# Patient Record
Sex: MALE | Race: BLACK OR AFRICAN AMERICAN | NOT HISPANIC OR LATINO | URBAN - METROPOLITAN AREA
[De-identification: names, ages, dates, MRNs, and addresses within clinical notes are randomized per-mention and may not be internally consistent; named-entity substitution may affect disease eponyms.]

---

## 2019-07-17 ENCOUNTER — INPATIENT (INPATIENT)
Facility: HOSPITAL | Age: 19
LOS: 1 days | Discharge: HOME | End: 2019-07-19
Attending: INTERNAL MEDICINE | Admitting: INTERNAL MEDICINE
Payer: SELF-PAY

## 2019-07-17 VITALS
SYSTOLIC BLOOD PRESSURE: 156 MMHG | TEMPERATURE: 101 F | HEIGHT: 67 IN | WEIGHT: 160.06 LBS | OXYGEN SATURATION: 98 % | HEART RATE: 124 BPM | DIASTOLIC BLOOD PRESSURE: 83 MMHG | RESPIRATION RATE: 22 BRPM

## 2019-07-17 DIAGNOSIS — R19.7 DIARRHEA, UNSPECIFIED: ICD-10-CM

## 2019-07-17 DIAGNOSIS — E83.39 OTHER DISORDERS OF PHOSPHORUS METABOLISM: ICD-10-CM

## 2019-07-17 DIAGNOSIS — A41.9 SEPSIS, UNSPECIFIED ORGANISM: ICD-10-CM

## 2019-07-17 DIAGNOSIS — A08.4 VIRAL INTESTINAL INFECTION, UNSPECIFIED: ICD-10-CM

## 2019-07-17 DIAGNOSIS — R11.2 NAUSEA WITH VOMITING, UNSPECIFIED: ICD-10-CM

## 2019-07-17 DIAGNOSIS — E83.42 HYPOMAGNESEMIA: ICD-10-CM

## 2019-07-17 DIAGNOSIS — E87.2 ACIDOSIS: ICD-10-CM

## 2019-07-17 DIAGNOSIS — R65.20 SEVERE SEPSIS WITHOUT SEPTIC SHOCK: ICD-10-CM

## 2019-07-17 DIAGNOSIS — E86.0 DEHYDRATION: ICD-10-CM

## 2019-07-17 LAB
ALBUMIN SERPL ELPH-MCNC: 4.1 G/DL — SIGNIFICANT CHANGE UP (ref 3.3–5)
ALP SERPL-CCNC: 77 U/L — SIGNIFICANT CHANGE UP (ref 40–120)
ALT FLD-CCNC: 25 U/L — SIGNIFICANT CHANGE UP (ref 12–78)
ANION GAP SERPL CALC-SCNC: 11 MMOL/L — SIGNIFICANT CHANGE UP (ref 5–17)
APAP SERPL-MCNC: < 2 UG/ML (ref 10–30)
APTT BLD: 34.2 SEC — SIGNIFICANT CHANGE UP (ref 27.5–36.3)
AST SERPL-CCNC: 14 U/L — LOW (ref 15–37)
BASOPHILS # BLD AUTO: 0.04 K/UL — SIGNIFICANT CHANGE UP (ref 0–0.2)
BASOPHILS NFR BLD AUTO: 0.5 % — SIGNIFICANT CHANGE UP (ref 0–2)
BILIRUB SERPL-MCNC: 0.6 MG/DL — SIGNIFICANT CHANGE UP (ref 0.2–1.2)
BUN SERPL-MCNC: 13 MG/DL — SIGNIFICANT CHANGE UP (ref 7–23)
CALCIUM SERPL-MCNC: 9.3 MG/DL — SIGNIFICANT CHANGE UP (ref 8.5–10.1)
CHLORIDE SERPL-SCNC: 103 MMOL/L — SIGNIFICANT CHANGE UP (ref 96–108)
CO2 SERPL-SCNC: 24 MMOL/L — SIGNIFICANT CHANGE UP (ref 22–31)
CREAT SERPL-MCNC: 1.3 MG/DL — SIGNIFICANT CHANGE UP (ref 0.5–1.3)
EOSINOPHIL # BLD AUTO: 0.06 K/UL — SIGNIFICANT CHANGE UP (ref 0–0.5)
EOSINOPHIL NFR BLD AUTO: 0.7 % — SIGNIFICANT CHANGE UP (ref 0–6)
ETHANOL SERPL-MCNC: <10 MG/DL — SIGNIFICANT CHANGE UP (ref 0–10)
FLU A RESULT: SIGNIFICANT CHANGE UP
FLU A RESULT: SIGNIFICANT CHANGE UP
FLUAV AG NPH QL: SIGNIFICANT CHANGE UP
FLUBV AG NPH QL: SIGNIFICANT CHANGE UP
GLUCOSE SERPL-MCNC: 102 MG/DL — HIGH (ref 70–99)
HCT VFR BLD CALC: 47 % — SIGNIFICANT CHANGE UP (ref 39–50)
HGB BLD-MCNC: 14.9 G/DL — SIGNIFICANT CHANGE UP (ref 13–17)
IMM GRANULOCYTES NFR BLD AUTO: 0.4 % — SIGNIFICANT CHANGE UP (ref 0–1.5)
INR BLD: 1.03 RATIO — SIGNIFICANT CHANGE UP (ref 0.88–1.16)
LACTATE SERPL-SCNC: 1.6 MMOL/L — SIGNIFICANT CHANGE UP (ref 0.7–2)
LACTATE SERPL-SCNC: 3.2 MMOL/L — HIGH (ref 0.7–2)
LYMPHOCYTES # BLD AUTO: 0.79 K/UL — LOW (ref 1–3.3)
LYMPHOCYTES # BLD AUTO: 9.4 % — LOW (ref 13–44)
MCHC RBC-ENTMCNC: 26.6 PG — LOW (ref 27–34)
MCHC RBC-ENTMCNC: 31.7 GM/DL — LOW (ref 32–36)
MCV RBC AUTO: 83.9 FL — SIGNIFICANT CHANGE UP (ref 80–100)
MONOCYTES # BLD AUTO: 0.38 K/UL — SIGNIFICANT CHANGE UP (ref 0–0.9)
MONOCYTES NFR BLD AUTO: 4.5 % — SIGNIFICANT CHANGE UP (ref 2–14)
NEUTROPHILS # BLD AUTO: 7.08 K/UL — SIGNIFICANT CHANGE UP (ref 1.8–7.4)
NEUTROPHILS NFR BLD AUTO: 84.5 % — HIGH (ref 43–77)
NRBC # BLD: 0 /100 WBCS — SIGNIFICANT CHANGE UP (ref 0–0)
PLATELET # BLD AUTO: 210 K/UL — SIGNIFICANT CHANGE UP (ref 150–400)
POTASSIUM SERPL-MCNC: 3.4 MMOL/L — LOW (ref 3.5–5.3)
POTASSIUM SERPL-SCNC: 3.4 MMOL/L — LOW (ref 3.5–5.3)
PROT SERPL-MCNC: 8 GM/DL — SIGNIFICANT CHANGE UP (ref 6–8.3)
PROTHROM AB SERPL-ACNC: 11.6 SEC — SIGNIFICANT CHANGE UP (ref 10–12.9)
RBC # BLD: 5.6 M/UL — SIGNIFICANT CHANGE UP (ref 4.2–5.8)
RBC # FLD: 13.9 % — SIGNIFICANT CHANGE UP (ref 10.3–14.5)
RSV RESULT: SIGNIFICANT CHANGE UP
RSV RNA RESP QL NAA+PROBE: SIGNIFICANT CHANGE UP
SALICYLATES SERPL-MCNC: <1.7 MG/DL — LOW (ref 2.8–20)
SODIUM SERPL-SCNC: 138 MMOL/L — SIGNIFICANT CHANGE UP (ref 135–145)
WBC # BLD: 8.38 K/UL — SIGNIFICANT CHANGE UP (ref 3.8–10.5)
WBC # FLD AUTO: 8.38 K/UL — SIGNIFICANT CHANGE UP (ref 3.8–10.5)

## 2019-07-17 PROCEDURE — 71045 X-RAY EXAM CHEST 1 VIEW: CPT | Mod: 26

## 2019-07-17 PROCEDURE — 76700 US EXAM ABDOM COMPLETE: CPT | Mod: 26

## 2019-07-17 PROCEDURE — 99285 EMERGENCY DEPT VISIT HI MDM: CPT

## 2019-07-17 RX ORDER — ONDANSETRON 8 MG/1
4 TABLET, FILM COATED ORAL ONCE
Refills: 0 | Status: COMPLETED | OUTPATIENT
Start: 2019-07-17 | End: 2019-07-17

## 2019-07-17 RX ORDER — ACETAMINOPHEN 500 MG
975 TABLET ORAL ONCE
Refills: 0 | Status: COMPLETED | OUTPATIENT
Start: 2019-07-17 | End: 2019-07-17

## 2019-07-17 RX ORDER — PIPERACILLIN AND TAZOBACTAM 4; .5 G/20ML; G/20ML
3.38 INJECTION, POWDER, LYOPHILIZED, FOR SOLUTION INTRAVENOUS ONCE
Refills: 0 | Status: COMPLETED | OUTPATIENT
Start: 2019-07-17 | End: 2019-07-17

## 2019-07-17 RX ORDER — SODIUM CHLORIDE 9 MG/ML
2300 INJECTION INTRAMUSCULAR; INTRAVENOUS; SUBCUTANEOUS ONCE
Refills: 0 | Status: COMPLETED | OUTPATIENT
Start: 2019-07-17 | End: 2019-07-17

## 2019-07-17 RX ADMIN — SODIUM CHLORIDE 2300 MILLILITER(S): 9 INJECTION INTRAMUSCULAR; INTRAVENOUS; SUBCUTANEOUS at 21:13

## 2019-07-17 RX ADMIN — PIPERACILLIN AND TAZOBACTAM 3.38 GRAM(S): 4; .5 INJECTION, POWDER, LYOPHILIZED, FOR SOLUTION INTRAVENOUS at 22:50

## 2019-07-17 RX ADMIN — ONDANSETRON 4 MILLIGRAM(S): 8 TABLET, FILM COATED ORAL at 23:09

## 2019-07-17 RX ADMIN — PIPERACILLIN AND TAZOBACTAM 200 GRAM(S): 4; .5 INJECTION, POWDER, LYOPHILIZED, FOR SOLUTION INTRAVENOUS at 22:20

## 2019-07-17 RX ADMIN — Medication 975 MILLIGRAM(S): at 21:13

## 2019-07-17 RX ADMIN — Medication 975 MILLIGRAM(S): at 21:43

## 2019-07-17 RX ADMIN — SODIUM CHLORIDE 2300 MILLILITER(S): 9 INJECTION INTRAMUSCULAR; INTRAVENOUS; SUBCUTANEOUS at 22:20

## 2019-07-17 NOTE — ED ADULT NURSE NOTE - NSIMPLEMENTINTERV_GEN_ALL_ED
Implemented All Universal Safety Interventions:  [place] to call system. Call bell, personal items and telephone within reach. Instruct patient to call for assistance. Room bathroom lighting operational. Non-slip footwear when patient is off stretcher. Physically safe environment: no spills, clutter or unnecessary equipment. Stretcher in lowest position, wheels locked, appropriate side rails in place.

## 2019-07-17 NOTE — ED PROVIDER NOTE - PHYSICAL EXAMINATION
Gen: Alert, slightly ill appearing  Head: NC, AT, PERRL, EOMI, normal lids/conjunctiva  ENT: normal hearing, patent oropharynx without erythema/exudate, uvula midline  Neck: +supple, no tenderness/meningismus/JVD, +Trachea midline  Pulm: Bilateral BS, normal resp effort, no wheeze/stridor/retractions  CV: tachycardia, no M/R/G, +dist pulses  Abd: soft, +RUQ tenderness, no guarding, ND, +BS, no palpable masses  Mskel: no edema/erythema/cyanosis  Skin: no rash, warm/dry  Neuro: AAOx3, no apparent sensory/motor deficits, coordination intact

## 2019-07-17 NOTE — ED ADULT NURSE NOTE - OBJECTIVE STATEMENT
pt received to bed 10 sepsis protocol followed for increased temp and HR. pt shivering, c/o chest pain and difficulty breathing and generalized weakness

## 2019-07-17 NOTE — ED PROVIDER NOTE - CLINICAL SUMMARY MEDICAL DECISION MAKING FREE TEXT BOX
Patient presents for symptoms as described in HPI.  Vital signs improved, patient currently feeling much better.  Lab values reviewed, there are no values which require acute intervention other than mild hypokalemia.  CT and US without acute findings, appendix not well seen but has no RLQ tenderness to palp, says that abdominal pain totally resolved after vomiting.  CXR clear, UA negative.  D/w patient and family that symptoms most likely represent viral cause, but will send penicillin to pharm to cover for strep pharyngitis.  Patient declined admission, feels much improved. Discussed results and outcome of today's visit with the patient.  Patient advised to please follow up with another healthcare provider within the next 24 hours and return to the Emergency Department for worsening symptoms or any other concerns.  Patient advised that their doctor may call  to follow up on the specific results of the tests performed today in the emergency department.   Patient appears well on discharge. Patient presents for symptoms as described in HPI.  Vital signs improved, patient initially felt better and was to be discharged however started vomiting again, does not feel well for d/c.  Lab values reviewed, there are no values which require acute intervention other than mild hypokalemia.  CT and US without acute findings, appendix not well seen has NO RLQ tenderness to palp, says that abdominal pain totally resolved after vomiting.  CXR clear, UA negative.  D/w patient and family that symptoms most likely represent viral cause or gastroenteritis. Patient is to be admitted to the hospital and the case was discussed with the admitting physician.  Any changes in plan, additional imaging/labs, and further work up will be at the discretion of the admitting physician.

## 2019-07-17 NOTE — ED ADULT NURSE NOTE - ED STAT RN HANDOFF DETAILS 2
Report received from RN at this time. Assessment available on [facility]. will continue to monitor

## 2019-07-17 NOTE — ED PROVIDER NOTE - OBJECTIVE STATEMENT
Pertinent PMH/PSH/FHx/SHx and Review of Systems contained within:  Patient presents to the ED for fever of unclear source.  Patient reports 1 week of not feeling like himself, felt weak, today was worse than prior days.  Has been having nonbloody diarrhea, now with mild nausea and RUQ abdominal pain, had small vomiting in ER.  Does endorse mild sore throat and cough.  No chest pain, dyspnea, neck stiffness, urinary symptoms.  No known travel or sick contacts.     Relevant PMHx/SHx/SOCHx/FAMH:  Denies relevant pmh or psh  Patient denies EtOH/tobacco/illicit substance use.    ROS: No headache/photophobia/eye pain/changes in vision, No ear pain/sore throat/dysphagia, No chest pain/palpitations, no SOB/cough/wheeze/stridor, No melena, no dysuria/frequency/discharge, No neck/back pain, no rash, no changes in neurological status/function. Pertinent PMH/PSH/FHx/SHx and Review of Systems contained within:  Patient presents to the ED for fever of unclear source.  Patient reports 1 week of not feeling like himself, felt weak, today was worse than prior days.  Has been having nonbloody diarrhea, now with mild nausea and RUQ abdominal pain, had small vomiting in ER.  Does endorse sore throat and mild cough.  No chest pain, dyspnea, neck stiffness, urinary symptoms.  No known travel or sick contacts.     Relevant PMHx/SHx/SOCHx/FAMH:  Denies relevant pmh or psh  Patient denies EtOH/tobacco/illicit substance use.    ROS: No headache/photophobia/eye pain/changes in vision, No ear pain/sore throat/dysphagia, No chest pain/palpitations, no SOB/cough/wheeze/stridor, No melena, no dysuria/frequency/discharge, No neck/back pain, no rash, no changes in neurological status/function.

## 2019-07-17 NOTE — ED ADULT NURSE NOTE - ED STAT RN HANDOFF DETAILS
Report endorsed to oncoming RN Ranjana SOMERS Safety checks compld this shift/Safety rounds completed hourly.  IV sites checked Q2+remains WDL. Meds given as ord with no s/s of adverse RXNs. Fall +skin precs in place. Any issues endorsed to oncoming RN for follow up.

## 2019-07-17 NOTE — ED PROVIDER NOTE - CARE PLAN
Principal Discharge DX:	Fever and chills  Secondary Diagnosis:	Sore throat Principal Discharge DX:	Fever and chills  Secondary Diagnosis:	Sore throat  Secondary Diagnosis:	Vomiting

## 2019-07-18 DIAGNOSIS — R11.2 NAUSEA WITH VOMITING, UNSPECIFIED: ICD-10-CM

## 2019-07-18 DIAGNOSIS — R50.9 FEVER, UNSPECIFIED: ICD-10-CM

## 2019-07-18 LAB
ALBUMIN SERPL ELPH-MCNC: 3.5 G/DL — SIGNIFICANT CHANGE UP (ref 3.3–5)
ALP SERPL-CCNC: 68 U/L — SIGNIFICANT CHANGE UP (ref 40–120)
ALT FLD-CCNC: 40 U/L — SIGNIFICANT CHANGE UP (ref 12–78)
AMPHET UR-MCNC: NEGATIVE — SIGNIFICANT CHANGE UP
ANION GAP SERPL CALC-SCNC: 6 MMOL/L — SIGNIFICANT CHANGE UP (ref 5–17)
APPEARANCE UR: CLEAR — SIGNIFICANT CHANGE UP
AST SERPL-CCNC: 25 U/L — SIGNIFICANT CHANGE UP (ref 15–37)
BARBITURATES UR SCN-MCNC: NEGATIVE — SIGNIFICANT CHANGE UP
BASOPHILS # BLD AUTO: 0.02 K/UL — SIGNIFICANT CHANGE UP (ref 0–0.2)
BASOPHILS NFR BLD AUTO: 0.2 % — SIGNIFICANT CHANGE UP (ref 0–2)
BENZODIAZ UR-MCNC: NEGATIVE — SIGNIFICANT CHANGE UP
BILIRUB DIRECT SERPL-MCNC: 0.27 MG/DL — HIGH (ref 0.05–0.2)
BILIRUB INDIRECT FLD-MCNC: 0.3 MG/DL — SIGNIFICANT CHANGE UP (ref 0.2–1)
BILIRUB SERPL-MCNC: 0.6 MG/DL — SIGNIFICANT CHANGE UP (ref 0.2–1.2)
BILIRUB UR-MCNC: NEGATIVE — SIGNIFICANT CHANGE UP
BUN SERPL-MCNC: 14 MG/DL — SIGNIFICANT CHANGE UP (ref 7–23)
CALCIUM SERPL-MCNC: 8.7 MG/DL — SIGNIFICANT CHANGE UP (ref 8.5–10.1)
CHLORIDE SERPL-SCNC: 107 MMOL/L — SIGNIFICANT CHANGE UP (ref 96–108)
CO2 SERPL-SCNC: 25 MMOL/L — SIGNIFICANT CHANGE UP (ref 22–31)
COCAINE METAB.OTHER UR-MCNC: NEGATIVE — SIGNIFICANT CHANGE UP
COLOR SPEC: YELLOW — SIGNIFICANT CHANGE UP
CREAT SERPL-MCNC: 1.26 MG/DL — SIGNIFICANT CHANGE UP (ref 0.5–1.3)
DIFF PNL FLD: NEGATIVE — SIGNIFICANT CHANGE UP
EOSINOPHIL # BLD AUTO: 0 K/UL — SIGNIFICANT CHANGE UP (ref 0–0.5)
EOSINOPHIL NFR BLD AUTO: 0 % — SIGNIFICANT CHANGE UP (ref 0–6)
GLUCOSE SERPL-MCNC: 122 MG/DL — HIGH (ref 70–99)
GLUCOSE UR QL: NEGATIVE MG/DL — SIGNIFICANT CHANGE UP
HAV IGM SER-ACNC: SIGNIFICANT CHANGE UP
HBV CORE IGM SER-ACNC: SIGNIFICANT CHANGE UP
HBV SURFACE AG SER-ACNC: SIGNIFICANT CHANGE UP
HCT VFR BLD CALC: 40.4 % — SIGNIFICANT CHANGE UP (ref 39–50)
HCV AB S/CO SERPL IA: 0.17 S/CO — SIGNIFICANT CHANGE UP (ref 0–0.99)
HCV AB SERPL-IMP: SIGNIFICANT CHANGE UP
HGB BLD-MCNC: 13.3 G/DL — SIGNIFICANT CHANGE UP (ref 13–17)
IMM GRANULOCYTES NFR BLD AUTO: 0.3 % — SIGNIFICANT CHANGE UP (ref 0–1.5)
KETONES UR-MCNC: NEGATIVE — SIGNIFICANT CHANGE UP
LEUKOCYTE ESTERASE UR-ACNC: NEGATIVE — SIGNIFICANT CHANGE UP
LIDOCAIN IGE QN: 118 U/L — SIGNIFICANT CHANGE UP (ref 73–393)
LYMPHOCYTES # BLD AUTO: 0.25 K/UL — LOW (ref 1–3.3)
LYMPHOCYTES # BLD AUTO: 2.1 % — LOW (ref 13–44)
MAGNESIUM SERPL-MCNC: 1.6 MG/DL — SIGNIFICANT CHANGE UP (ref 1.6–2.6)
MCHC RBC-ENTMCNC: 27 PG — SIGNIFICANT CHANGE UP (ref 27–34)
MCHC RBC-ENTMCNC: 32.9 GM/DL — SIGNIFICANT CHANGE UP (ref 32–36)
MCV RBC AUTO: 81.9 FL — SIGNIFICANT CHANGE UP (ref 80–100)
METHADONE UR-MCNC: NEGATIVE — SIGNIFICANT CHANGE UP
MONOCYTES # BLD AUTO: 0.75 K/UL — SIGNIFICANT CHANGE UP (ref 0–0.9)
MONOCYTES NFR BLD AUTO: 6.2 % — SIGNIFICANT CHANGE UP (ref 2–14)
NEUTROPHILS # BLD AUTO: 10.98 K/UL — HIGH (ref 1.8–7.4)
NEUTROPHILS NFR BLD AUTO: 91.2 % — HIGH (ref 43–77)
NITRITE UR-MCNC: NEGATIVE — SIGNIFICANT CHANGE UP
NRBC # BLD: 0 /100 WBCS — SIGNIFICANT CHANGE UP (ref 0–0)
OPIATES UR-MCNC: NEGATIVE — SIGNIFICANT CHANGE UP
PCP SPEC-MCNC: SIGNIFICANT CHANGE UP
PCP UR-MCNC: NEGATIVE — SIGNIFICANT CHANGE UP
PH UR: 7 — SIGNIFICANT CHANGE UP (ref 5–8)
PHOSPHATE SERPL-MCNC: 1.4 MG/DL — LOW (ref 2.5–4.5)
PLATELET # BLD AUTO: 198 K/UL — SIGNIFICANT CHANGE UP (ref 150–400)
POTASSIUM SERPL-MCNC: 3.5 MMOL/L — SIGNIFICANT CHANGE UP (ref 3.5–5.3)
POTASSIUM SERPL-SCNC: 3.5 MMOL/L — SIGNIFICANT CHANGE UP (ref 3.5–5.3)
PROT SERPL-MCNC: 7.2 GM/DL — SIGNIFICANT CHANGE UP (ref 6–8.3)
PROT UR-MCNC: NEGATIVE MG/DL — SIGNIFICANT CHANGE UP
RBC # BLD: 4.93 M/UL — SIGNIFICANT CHANGE UP (ref 4.2–5.8)
RBC # FLD: 14 % — SIGNIFICANT CHANGE UP (ref 10.3–14.5)
SODIUM SERPL-SCNC: 138 MMOL/L — SIGNIFICANT CHANGE UP (ref 135–145)
SP GR SPEC: 1 — LOW (ref 1.01–1.02)
THC UR QL: NEGATIVE — SIGNIFICANT CHANGE UP
UROBILINOGEN FLD QL: NEGATIVE MG/DL — SIGNIFICANT CHANGE UP
WBC # BLD: 12.04 K/UL — HIGH (ref 3.8–10.5)
WBC # FLD AUTO: 12.04 K/UL — HIGH (ref 3.8–10.5)

## 2019-07-18 PROCEDURE — 99356: CPT

## 2019-07-18 PROCEDURE — 99223 1ST HOSP IP/OBS HIGH 75: CPT

## 2019-07-18 PROCEDURE — 74177 CT ABD & PELVIS W/CONTRAST: CPT | Mod: 26

## 2019-07-18 PROCEDURE — 99222 1ST HOSP IP/OBS MODERATE 55: CPT

## 2019-07-18 PROCEDURE — 99253 IP/OBS CNSLTJ NEW/EST LOW 45: CPT

## 2019-07-18 RX ORDER — METOCLOPRAMIDE HCL 10 MG
10 TABLET ORAL ONCE
Refills: 0 | Status: COMPLETED | OUTPATIENT
Start: 2019-07-18 | End: 2019-07-18

## 2019-07-18 RX ORDER — MORPHINE SULFATE 50 MG/1
2 CAPSULE, EXTENDED RELEASE ORAL EVERY 6 HOURS
Refills: 0 | Status: DISCONTINUED | OUTPATIENT
Start: 2019-07-18 | End: 2019-07-19

## 2019-07-18 RX ORDER — POTASSIUM PHOSPHATE, MONOBASIC POTASSIUM PHOSPHATE, DIBASIC 236; 224 MG/ML; MG/ML
30 INJECTION, SOLUTION INTRAVENOUS ONCE
Refills: 0 | Status: DISCONTINUED | OUTPATIENT
Start: 2019-07-18 | End: 2019-07-18

## 2019-07-18 RX ORDER — MAGNESIUM SULFATE 500 MG/ML
1 VIAL (ML) INJECTION ONCE
Refills: 0 | Status: COMPLETED | OUTPATIENT
Start: 2019-07-18 | End: 2019-07-18

## 2019-07-18 RX ORDER — MORPHINE SULFATE 50 MG/1
2 CAPSULE, EXTENDED RELEASE ORAL EVERY 4 HOURS
Refills: 0 | Status: DISCONTINUED | OUTPATIENT
Start: 2019-07-18 | End: 2019-07-19

## 2019-07-18 RX ORDER — POTASSIUM CHLORIDE 20 MEQ
40 PACKET (EA) ORAL ONCE
Refills: 0 | Status: COMPLETED | OUTPATIENT
Start: 2019-07-18 | End: 2019-07-18

## 2019-07-18 RX ORDER — SODIUM CHLORIDE 9 MG/ML
1000 INJECTION, SOLUTION INTRAVENOUS
Refills: 0 | Status: DISCONTINUED | OUTPATIENT
Start: 2019-07-18 | End: 2019-07-19

## 2019-07-18 RX ORDER — POTASSIUM PHOSPHATE, MONOBASIC POTASSIUM PHOSPHATE, DIBASIC 236; 224 MG/ML; MG/ML
30 INJECTION, SOLUTION INTRAVENOUS ONCE
Refills: 0 | Status: COMPLETED | OUTPATIENT
Start: 2019-07-18 | End: 2019-07-18

## 2019-07-18 RX ORDER — PENICILLIN V POTASSIUM 250 MG
1 TABLET ORAL
Qty: 20 | Refills: 0
Start: 2019-07-18 | End: 2019-07-27

## 2019-07-18 RX ORDER — IBUPROFEN 200 MG
400 TABLET ORAL EVERY 6 HOURS
Refills: 0 | Status: DISCONTINUED | OUTPATIENT
Start: 2019-07-18 | End: 2019-07-19

## 2019-07-18 RX ORDER — ONDANSETRON 8 MG/1
4 TABLET, FILM COATED ORAL EVERY 6 HOURS
Refills: 0 | Status: DISCONTINUED | OUTPATIENT
Start: 2019-07-18 | End: 2019-07-19

## 2019-07-18 RX ORDER — ACETAMINOPHEN 500 MG
650 TABLET ORAL EVERY 6 HOURS
Refills: 0 | Status: DISCONTINUED | OUTPATIENT
Start: 2019-07-18 | End: 2019-07-19

## 2019-07-18 RX ADMIN — POTASSIUM PHOSPHATE, MONOBASIC POTASSIUM PHOSPHATE, DIBASIC 83.33 MILLIMOLE(S): 236; 224 INJECTION, SOLUTION INTRAVENOUS at 12:03

## 2019-07-18 RX ADMIN — Medication 650 MILLIGRAM(S): at 06:16

## 2019-07-18 RX ADMIN — SODIUM CHLORIDE 125 MILLILITER(S): 9 INJECTION, SOLUTION INTRAVENOUS at 05:04

## 2019-07-18 RX ADMIN — Medication 40 MILLIEQUIVALENT(S): at 01:34

## 2019-07-18 RX ADMIN — Medication 10 MILLIGRAM(S): at 02:57

## 2019-07-18 RX ADMIN — Medication 400 MILLIGRAM(S): at 18:12

## 2019-07-18 RX ADMIN — Medication 400 MILLIGRAM(S): at 19:27

## 2019-07-18 RX ADMIN — Medication 100 GRAM(S): at 09:22

## 2019-07-18 NOTE — PROGRESS NOTE ADULT - ASSESSMENT
Severe sepsis with lactic acidosis, possible gastroenteritis  GI consult pending - Dr. Hahn  ID consult called - Dr. Allred  Follow up culture results  Keep NPO with IV fluids  IV Zofran prn N/V  IV Morphine prn pain  check stool culture, O&P, C. diff    Hypokalemia, Hypophosphatemia, Hypomagnesemia  IV Mg and Kphos ordered  monitor labs

## 2019-07-18 NOTE — H&P ADULT - PROBLEM SELECTOR PLAN 1
likely gastroenteritis, NPO, IV fluids, antiemetics, stool culture, drug screen, viral studies  GI consult

## 2019-07-18 NOTE — H&P ADULT - HISTORY OF PRESENT ILLNESS
18 y/o Male  no significant PMH, PSH presents to the ED for fever, nausea, vomiting, abdominal pain and diarrhea for past day.  Patient reports 1 week of not feeling like himself, felt weak, today was worse than prior days.  Has been having nonbloody diarrhea, now with mild nausea and RUQ abdominal pain, which has since resolved, had small nbnb vomiting in ER.  Does endorse sore throat and mild cough.  No chest pain, dyspnea, neck stiffness, urinary symptoms.  No known travel or sick contacts. On no medications.  .

## 2019-07-18 NOTE — H&P ADULT - ASSESSMENT
18 y/o Male  no significant PMH, PSH presents to the ED for fever, nausea, vomiting, abdominal pain and diarrhea for past day.  Patient reports 1 week of not feeling like himself, felt weak, today was worse than prior days.  Has been having nonbloody diarrhea, now with mild nausea and RUQ abdominal pain, which has since resolved, had small nbnb vomiting in ER.  Does endorse sore throat and mild cough.  No chest pain, dyspnea, neck stiffness, urinary symptoms.  No known travel or sick contacts. On no medications. Probable gastroenteritis, imaging shows no acute pathology, no RLQ tenderness, lipase, drug screen ordered, NPO, IV fluids, stool culture and lactoferrin, antiemetics.  IMPROVE VTE Individual Risk Assessment          RISK                                                          Points    [  ] Previous VTE                                                3    [  ] Thrombophilia                                             2    [  ] Lower limb paralysis                                    2        (unable to hold up >15 seconds)      [  ] Current Cancer                                             2         (within 6 months)    [  ] Immobilization > 24 hrs                              1    [  ] ICU/CCU stay > 24 hours                            1    [  ] Age > 60                                                    1    IMPROVE VTE Score ____0_____

## 2019-07-18 NOTE — H&P ADULT - NSHPLABSRESULTS_GEN_ALL_CORE
LABS:                        14.9   8.38  )-----------( 210      ( 2019 21:57 )             47.0         138  |  103  |  13  ----------------------------<  102<H>  3.4<L>   |  24  |  1.30    Ca    9.3      2019 21:57    TPro  8.0  /  Alb  4.1  /  TBili  0.6  /  DBili  x   /  AST  14<L>  /  ALT  25  /  AlkPhos  77      PT/INR - ( 2019 21:57 )   PT: 11.6 sec;   INR: 1.03 ratio         PTT - ( 2019 21:57 )  PTT:34.2 sec  Urinalysis Basic - ( 2019 01:43 )    Color: Yellow / Appearance: Clear / S.005 / pH: x  Gluc: x / Ketone: Negative  / Bili: Negative / Urobili: Negative mg/dL   Blood: x / Protein: Negative mg/dL / Nitrite: Negative   Leuk Esterase: Negative / RBC: x / WBC x   Sq Epi: x / Non Sq Epi: x / Bacteria: x      CAPILLARY BLOOD GLUCOSE  Lactate, Blood: 3.2 mmol/L (19 @ 21:57)  Lactate, Blood: 1.6 mmol/L (19 @ 23:26)            RADIOLOGY & ADDITIONAL TESTS:  < from: CT Abdomen and Pelvis w/ IV Cont (19 @ 00:03) >    LIVER: Within normal limits.  BILE DUCTS: Normal caliber.  GALLBLADDER: Within normal limits.  SPLEEN: Within normal limits.  PANCREAS: Within normal limits.  ADRENALS: Within normal limits.  KIDNEYS/URETERS: Low-attenuation lesion within the left kidney too small   to accurately characterize. No hydronephrosis. Symmetric parenchymal   enhancement.    BLADDER: Within normal limits.  REPRODUCTIVE ORGANS: Prostate within normal limits.    BOWEL: No bowel obstruction. Appendix not definitively identified,   however no secondary signs of appendicitis.  PERITONEUM: Trace pelvic free fluid, nonspecific.  VESSELS:  Within normal limits.  RETROPERITONEUM: No lymphadenopathy.    ABDOMINAL WALL: Within normal limits.  BONES: Within normal limits.    < from: US Abdomen Complete (19 @ 23:48) >    IMPRESSION:     No cholelithiasis or sonographic evidence of acute cholecystitis.    < end of copied text >          Imaging Personally Reviewed:  [ x] YES  [ ] NO

## 2019-07-18 NOTE — H&P ADULT - NSHPREVIEWOFSYSTEMS_GEN_ALL_CORE
ROS: No headache/photophobia/eye pain/changes in vision, No ear pain/dysphagia, No chest pain/palpitations, no SOB/cough/wheeze/stridor, No melena, no dysuria/frequency/discharge, No neck/back pain, no rash, no changes in neurological status/function.

## 2019-07-18 NOTE — PROGRESS NOTE ADULT - SUBJECTIVE AND OBJECTIVE BOX
Patient is a 19y old  Male who presents with a chief complaint of Fever, vomiting and diarrhea x 1 day.  Denies sick contacts or recent travel.  No prior episodes.  Denies any drugs or alcohol use.  Asked by RN to re-evaluate for abnormal labs and fever.    INTERVAL HPI/OVERNIGHT EVENTS:  Feels slightly better.    MEDICATIONS  (STANDING):  lactated ringers. 1000 milliLiter(s) (125 mL/Hr) IV Continuous <Continuous>  magnesium sulfate  IVPB 1 Gram(s) IV Intermittent once  potassium phosphate IVPB 30 milliMole(s) IV Intermittent once    MEDICATIONS  (PRN):  acetaminophen   Tablet .. 650 milliGRAM(s) Oral every 6 hours PRN Temp greater or equal to 38C (100.4F), Mild Pain (1 - 3)  ondansetron Injectable 4 milliGRAM(s) IV Push every 6 hours PRN Nausea and/or Vomiting    Allergies:  No Known Allergies    REVIEW OF SYSTEMS:  All other systems reviewed and are negative    Vital Signs Last 24 Hrs  T(C): 37.9 (2019 07:31), Max: 39.4 (2019 20:49)  T(F): 100.2 (2019 07:31), Max: 103 (2019 20:49)  HR: 96 (2019 07:31) (92 - 126)  BP: 100/39 (2019 07:31) (95/34 - 156/83)  BP(mean): --  RR: 20 (2019 07:31) (15 - 22)  SpO2: 98% (2019 07:31) (95% - 100%)  Daily Height in cm: 170.18 (2019 20:33)    Daily   I&O's Summary    PHYSICAL EXAM:  GENERAL: NAD, well-groomed, well-developed  HEAD:  Atraumatic, Normocephalic  EYES: EOMI, PERRLA, conjunctiva and sclera clear  ENMT: No tonsillar erythema, exudates, or enlargement; Moist mucous membranes, Good dentition, No lesions  NECK: Supple, No JVD, Normal thyroid  NERVOUS SYSTEM:  Alert & Oriented X3, Good concentration; Motor Strength 5/5 B/L upper and lower extremities; DTRs 2+ intact and symmetric  CHEST/LUNG: Clear to percussion bilaterally; No rales, rhonchi, wheezing, or rubs  HEART: Regular rate and rhythm; No murmurs, rubs, or gallops  ABDOMEN: Soft, mild tenderness, Nondistended; Bowel sounds present  EXTREMITIES:  2+ Peripheral Pulses, No clubbing, cyanosis, or edema  LYMPH: No lymphadenopathy noted  SKIN: No rashes or lesions    Labs                      13.3   12.04 )-----------( 198      ( 2019 05:22 )             40.4     07-18    138  |  107  |  14  ----------------------------<  122<H>  3.5   |  25  |  1.26    Ca    8.7      2019 05:22  Phos  1.4     -  Mg     1.6         TPro  7.2  /  Alb  3.5  /  TBili  0.6  /  DBili  .27<H>  /  AST  25  /  ALT  40  /  AlkPhos  68  07-18    PT/INR - ( 2019 21:57 )   PT: 11.6 sec;   INR: 1.03 ratio    PTT - ( 2019 21:57 )  PTT:34.2 sec    Urinalysis Basic - ( 2019 01:43 )    Color: Yellow / Appearance: Clear / S.005 / pH: x  Gluc: x / Ketone: Negative  / Bili: Negative / Urobili: Negative mg/dL   Blood: x / Protein: Negative mg/dL / Nitrite: Negative   Leuk Esterase: Negative / RBC: x / WBC x   Sq Epi: x / Non Sq Epi: x / Bacteria: x    < from: CT Abdomen and Pelvis w/ IV Cont (19 @ 00:03) >  IMPRESSION:     No bowel obstruction or evidence of discrete inflammation.    Trace pelvic free fluid, nonspecific.    < from: US Abdomen Complete (19 @ 23:48) >  IMPRESSION:     No cholelithiasis or sonographic evidence of acute cholecystitis.        DVT prophylaxis: ambulatory, low risk

## 2019-07-18 NOTE — CONSULT NOTE ADULT - ASSESSMENT
19 yr old male seen with   1.fever with vomiting and diarrhea after eating chinese food   possible viral gastroenteritis  Now diarrhea is resolved and more vomiting   organisms like B cereus possible as well  cont to  manage supportively   no antibiotics for now   no dysentery etc  vomiting 20 times   cont NPO and advance as tolerated c  cont iV fluids   C diff etc has been ordered which is less likely   can be discharged once vomiting improves and weakness better   septic today as has leukocytosis as well 19 yr old male seen with   1.fever with vomiting and diarrhea after eating chinese food   possible viral gastroenteritis  Now diarrhea is resolved and more vomiting   organisms like B cereus possible as well  cont to  manage supportively   no antibiotics for now   no dysentery etc  vomiting 20 times   cont NPO and advance as tolerated c  cont iV fluids   C diff etc has been ordered which is less likely   can be discharged once vomiting improves and weakness better   2.septic today as has leukocytosis as well,sec to above  blood c/s NO growth so far   stool c/s ordered  3.elevated lactate : Sec to sepsis, dehydration   IV fluids

## 2019-07-18 NOTE — CONSULT NOTE ADULT - SUBJECTIVE AND OBJECTIVE BOX
HPI:  20 y/o Male  no significant PMH, PSH presents to the ED for fever, nausea, vomiting, abdominal pain and diarrhea for past day.  Patient reports 1 week of not feeling like himself, felt weak, today was worse than prior days.  Has been having nonbloody diarrhea, now with mild nausea and RUQ abdominal pain, which has since resolved, had small nbnb vomiting in ER.  Does endorse sore throat and mild cough.  No chest pain, dyspnea, neck stiffness, urinary symptoms.  No known travel or sick contacts. On no medications.  . (2019 03:55)  ----------------- As Above -----------------------------------------------------------  The patient was in his USOH until last week when he felt weak and dizzy. However, he developed nausea and vomiting yesterday AM at work. His symptoms worsened and he finally came to the ER. Non bloody.   See labs / CT scan   No recent travel, antibiotics, family hisotry of IBD / colon cancer. No other friends or family members with same symptoms. Ate chinese food with girl friend Tuesday night. Had a fresh burger Tuesday lunch.  Prior to Tuesday, the patient denies weight loss, melena, hematochezia, hematemesis, nausea, vomiting, abdominal pain, constipation, diarrhea, or change in bowel movements   Denies drugs.  This AM, patient denies N/V or abdominal pain. Last BM was 6 PM last night.      PAST MEDICAL & SURGICAL HISTORY:  No pertinent past medical history  No significant past surgical history      MEDICATIONS  (STANDING):  lactated ringers. 1000 milliLiter(s) (125 mL/Hr) IV Continuous <Continuous>  magnesium sulfate  IVPB 1 Gram(s) IV Intermittent once  potassium phosphate IVPB 30 milliMole(s) IV Intermittent once    MEDICATIONS  (PRN):  acetaminophen   Tablet .. 650 milliGRAM(s) Oral every 6 hours PRN Temp greater or equal to 38C (100.4F), Mild Pain (1 - 3)  morphine  - Injectable 2 milliGRAM(s) IV Push every 4 hours PRN Severe Pain (7 - 10)  morphine  - Injectable 2 milliGRAM(s) IV Push every 6 hours PRN Moderate Pain (4 - 6)  ondansetron Injectable 4 milliGRAM(s) IV Push every 6 hours PRN Nausea and/or Vomiting      Allergies    No Known Allergies    Intolerances        FAMILY HISTORY:  No pertinent family history in first degree relatives      REVIEW OF SYSTEMS:    CONSTITUTIONAL: No fever, weight loss, or fatigue  EYES: No eye pain, visual disturbances, or discharge  ENMT:  No difficulty hearing, tinnitus, vertigo; No sinus or throat pain  NECK: No pain or stiffness  BREASTS: No pain, masses, or nipple discharge  RESPIRATORY: No cough, wheezing, chills or hemoptysis; No shortness of breath  CARDIOVASCULAR: No chest pain, palpitations, dizziness, or leg swelling  GASTROINTESTINAL: See above  GENITOURINARY: No dysuria, frequency, hematuria, or incontinence  NEUROLOGICAL: No headaches, memory loss, loss of strength, numbness, or tremors  SKIN: No itching, burning, rashes, or lesions   LYMPH NODES: No enlarged glands  ENDOCRINE: No heat or cold intolerance; No hair loss  MUSCULOSKELETAL: No joint pain or swelling; No muscle, back, or extremity pain  PSYCHIATRIC: No depression, anxiety, mood swings, or difficulty sleeping  HEME/LYMPH: No easy bruising, or bleeding gums  ALLERGY AND IMMUNOLOGIC: No hives or eczema          SOCIAL HISTORY:    FAMILY HISTORY:  No pertinent family history in first degree relatives      Vital Signs Last 24 Hrs  T(C): 37.9 (2019 07:31), Max: 39.4 (2019 20:49)  T(F): 100.2 (2019 07:31), Max: 103 (2019 20:49)  HR: 96 (2019 07:31) (92 - 126)  BP: 100/39 (2019 07:31) (95/34 - 156/83)  BP(mean): --  RR: 20 (2019 07:31) (15 - 22)  SpO2: 98% (2019 07:31) (95% - 100%)    PHYSICAL EXAM:    GENERAL: NAD, well-groomed, well-developed  HEAD:  Atraumatic, Normocephalic  EYES: EOMI, PERRLA, conjunctiva and sclera clear  NECK: Supple, No JVD, Normal thyroid  NERVOUS SYSTEM:  Alert & Oriented X3, Good concentration; Motor Strength 5/5 B/L upper and lower extremities;   CHEST/LUNG: Clear to percussion bilaterally; No rales, rhonchi, wheezing, or rubs  HEART: Regular rate and rhythm; No murmurs, rubs, or gallops  ABDOMEN: Soft, Nontender, Nondistended; Bowel sounds present  EXTREMITIES:  2+ Peripheral Pulses, No clubbing, cyanosis, or edema  LYMPH: No lymphadenopathy noted   RECTAL: Deferred   SKIN: No rashes or lesions    LABS:                        13.3   12.04 )-----------( 198      ( 2019 05:22 )             40.4       CBC:   @ 05:22  WBC  12.04  HGB 13.3  HCT 40.4 Plate 198  MCV 81.9   @ 21:57  WBC  8.38  HGB 14.9  HCT 47.0 Plate 210  MCV 83.9           2019 05:22    138    |  107    |  14     ----------------------------<  122    3.5     |  25     |  1.26   2019 21:57    138    |  103    |  13     ----------------------------<  102    3.4     |  24     |  1.30     Ca    8.7        2019 05:22  Ca    9.3        2019 21:57  Phos  1.4       2019 05:22  Mg     1.6       2019 05:22    TPro  7.2    /  Alb  3.5    /  TBili  0.6    /  DBili  .27    /  AST  25     /  ALT  40     /  AlkPhos  68     2019 05:22  TPro  8.0    /  Alb  4.1    /  TBili  0.6    /  DBili  x      /  AST  14     /  ALT  25     /  AlkPhos  77     2019 21:57    PT/INR - ( 2019 21:57 )   PT: 11.6 sec;   INR: 1.03 ratio         PTT - ( 2019 21:57 )  PTT:34.2 sec  Urinalysis Basic - ( 2019 01:43 )    Color: Yellow / Appearance: Clear / S.005 / pH: x  Gluc: x / Ketone: Negative  / Bili: Negative / Urobili: Negative mg/dL   Blood: x / Protein: Negative mg/dL / Nitrite: Negative   Leuk Esterase: Negative / RBC: x / WBC x   Sq Epi: x / Non Sq Epi: x / Bacteria: x          RADIOLOGY & ADDITIONAL STUDIES:  < from: CT Abdomen and Pelvis w/ IV Cont (19 @ 00:03) >    EXAM:  CT ABDOMEN AND PELVIS IC                            PROCEDURE DATE:  2019          INTERPRETATION:  CLINICAL INFORMATION: Abdominal pain, diarrhea, and   fever.    COMPARISON: None.    PROCEDURE:   CT of the Abdomen and Pelvis was performed with intravenous contrast.   Intravenous contrast: 90 ml Omnipaque 350. 10 ml discarded.  Oral contrast: None.  Sagittal and coronal reformats were performed.    FINDINGS:    LOWER CHEST: Within normal limits.    LIVER: Within normal limits.  BILE DUCTS: Normal caliber.  GALLBLADDER: Within normal limits.  SPLEEN: Within normal limits.  PANCREAS: Within normal limits.  ADRENALS: Within normal limits.  KIDNEYS/URETERS: Low-attenuation lesion within the left kidney too small   to accurately characterize. No hydronephrosis. Symmetric parenchymal   enhancement.    BLADDER: Within normal limits.  REPRODUCTIVE ORGANS: Prostate within normal limits.    BOWEL: No bowel obstruction. Appendix not definitively identified,   however no secondary signs of appendicitis.  PERITONEUM: Trace pelvic free fluid, nonspecific.  VESSELS:  Within normal limits.  RETROPERITONEUM: No lymphadenopathy.    ABDOMINAL WALL: Within normal limits.  BONES: Within normal limits.    IMPRESSION:     No bowel obstruction or evidence of discrete inflammation.    Trace pelvic free fluid, nonspecific.                    DONALDO HENSON M.D., ATTENDING RADIOLOGIST  This document has been electronically signed. 2019 12:31AM          < end of copied text >    < from: US Abdomen Complete (19 @ 23:48) >    EXAM:  US ABDOMINAL COMPLETE                            PROCEDURE DATE:  2019          INTERPRETATION:  CLINICAL INFORMATION: Right upper quadrant abdominal   pain.    COMPARISON: None available.    TECHNIQUE: Sonography of the abdomen.     FINDINGS:    Liver: Within normal limits.    Bile ducts: Normal caliber. Common bile duct measures 2 mm.     Gallbladder: No cholelithiasis, gallbladder wall thickening, or   pericholecystic fluid. Negative sonographic Guzman's sign.    Pancreas: Visualized portions are within normal limits.    Spleen: 11.5 cm. Within normal limits.    Right kidney: 11.3 cm. No hydronephrosis.    Left kidney: 10.4 cm.  No hydronephrosis.    Ascites: None.    Aorta and IVC: Visualized portions are within normal limits.    IMPRESSION:     No cholelithiasis or sonographic evidence of acute cholecystitis.                DONALDO HENSON M.D., ATTENDING RADIOLOGIST  This document has been electronically signed. 2019 12:03AM                < end of copied text >

## 2019-07-18 NOTE — CONSULT NOTE ADULT - ASSESSMENT
HPI:  18 y/o Male  no significant PMH, PSH presents to the ED for fever, nausea, vomiting, abdominal pain and diarrhea for past day.  Patient reports 1 week of not feeling like himself, felt weak, today was worse than prior days.  Has been having nonbloody diarrhea, now with mild nausea and RUQ abdominal pain, which has since resolved, had small nbnb vomiting in ER.  Does endorse sore throat and mild cough.  No chest pain, dyspnea, neck stiffness, urinary symptoms.  No known travel or sick contacts. On no medications.  . (18 Jul 2019 03:55)  ----------------- As Above -----------------------------------------------------------  The patient was in his USOH until last week when he felt weak and dizzy. However, he developed nausea and vomiting yesterday AM at work. His symptoms worsened and he finally came to the ER. Non bloody.   See labs / CT scan   No recent travel, antibiotics, family hisotry of IBD / colon cancer. No other friends or family members with same symptoms. Ate chinese food with girl friend Tuesday night. Had a fresh burger Tuesday lunch.  Prior to Tuesday, the patient denies weight loss, melena, hematochezia, hematemesis, nausea, vomiting, abdominal pain, constipation, diarrhea, or change in bowel movements   Denies drugs.  This AM, patient denies N/V or abdominal pain. Last BM was 6 PM last night.    Patient probably experiencing gastroenteritis. Probably viral. Doubt IBD,  ischemia etc. 1) Stool samples  2) advance to clear liquids 3) F/U labs 4) No GI procedures at present time. 5) Zofran / Cholestyramine on a PRN basis.

## 2019-07-18 NOTE — H&P ADULT - NSHPPHYSICALEXAM_GEN_ALL_CORE
T(C): 38.1 (18 Jul 2019 02:33), Max: 39.4 (17 Jul 2019 20:49)  T(F): 100.5 (18 Jul 2019 02:33), Max: 103 (17 Jul 2019 20:49)  HR: 126 (18 Jul 2019 02:33) (92 - 126)  BP: 115/69 (18 Jul 2019 02:33) (114/47 - 156/83)  BP(mean): --  RR: 21 (18 Jul 2019 02:33) (15 - 22)  SpO2: 99% (18 Jul 2019 02:33) (95% - 100%)    PHYSICAL EXAM:  GENERAL: NAD, well-groomed, well-developed  HEAD:  Atraumatic, Normocephalic  EYES: EOMI, PERRLA, conjunctiva and sclera clear  ENMT: No tonsillar erythema, exudates, or enlargement; Moist mucous membranes, Good dentition, No lesions  NECK: Supple, No JVD, Normal thyroid  NERVOUS SYSTEM:  Alert & Oriented X3, Good concentration; Motor Strength 5/5 B/L upper and lower extremities; DTRs 2+ intact and symmetric  CHEST/LUNG: Clear to percussion bilaterally; No rales, rhonchi, wheezing, or rubs  HEART: Regular rate and rhythm; No murmurs, rubs, or gallops  ABDOMEN: Soft, diffuse tenderness, Nondistended; Bowel sounds present  EXTREMITIES:  2+ Peripheral Pulses, No clubbing, cyanosis, or edema  LYMPH: No lymphadenopathy noted  SKIN: No rashes or lesions

## 2019-07-19 VITALS
SYSTOLIC BLOOD PRESSURE: 131 MMHG | RESPIRATION RATE: 17 BRPM | DIASTOLIC BLOOD PRESSURE: 63 MMHG | HEART RATE: 70 BPM | OXYGEN SATURATION: 100 % | TEMPERATURE: 98 F

## 2019-07-19 LAB
ANION GAP SERPL CALC-SCNC: 2 MMOL/L — LOW (ref 5–17)
BUN SERPL-MCNC: 10 MG/DL — SIGNIFICANT CHANGE UP (ref 7–23)
CALCIUM SERPL-MCNC: 8.4 MG/DL — LOW (ref 8.5–10.1)
CHLORIDE SERPL-SCNC: 109 MMOL/L — HIGH (ref 96–108)
CO2 SERPL-SCNC: 29 MMOL/L — SIGNIFICANT CHANGE UP (ref 22–31)
CREAT SERPL-MCNC: 1.02 MG/DL — SIGNIFICANT CHANGE UP (ref 0.5–1.3)
CULTURE RESULTS: NO GROWTH — SIGNIFICANT CHANGE UP
GLUCOSE SERPL-MCNC: 91 MG/DL — SIGNIFICANT CHANGE UP (ref 70–99)
HCT VFR BLD CALC: 43.1 % — SIGNIFICANT CHANGE UP (ref 39–50)
HGB BLD-MCNC: 13.4 G/DL — SIGNIFICANT CHANGE UP (ref 13–17)
MAGNESIUM SERPL-MCNC: 2.1 MG/DL — SIGNIFICANT CHANGE UP (ref 1.6–2.6)
MCHC RBC-ENTMCNC: 26.7 PG — LOW (ref 27–34)
MCHC RBC-ENTMCNC: 31.1 GM/DL — LOW (ref 32–36)
MCV RBC AUTO: 85.9 FL — SIGNIFICANT CHANGE UP (ref 80–100)
NRBC # BLD: 0 /100 WBCS — SIGNIFICANT CHANGE UP (ref 0–0)
PHOSPHATE SERPL-MCNC: 2.7 MG/DL — SIGNIFICANT CHANGE UP (ref 2.5–4.5)
PLATELET # BLD AUTO: 157 K/UL — SIGNIFICANT CHANGE UP (ref 150–400)
POTASSIUM SERPL-MCNC: 4 MMOL/L — SIGNIFICANT CHANGE UP (ref 3.5–5.3)
POTASSIUM SERPL-SCNC: 4 MMOL/L — SIGNIFICANT CHANGE UP (ref 3.5–5.3)
RBC # BLD: 5.02 M/UL — SIGNIFICANT CHANGE UP (ref 4.2–5.8)
RBC # FLD: 14.9 % — HIGH (ref 10.3–14.5)
SODIUM SERPL-SCNC: 140 MMOL/L — SIGNIFICANT CHANGE UP (ref 135–145)
SPECIMEN SOURCE: SIGNIFICANT CHANGE UP
WBC # BLD: 6.2 K/UL — SIGNIFICANT CHANGE UP (ref 3.8–10.5)
WBC # FLD AUTO: 6.2 K/UL — SIGNIFICANT CHANGE UP (ref 3.8–10.5)

## 2019-07-19 PROCEDURE — 99232 SBSQ HOSP IP/OBS MODERATE 35: CPT

## 2019-07-19 PROCEDURE — 99239 HOSP IP/OBS DSCHRG MGMT >30: CPT

## 2019-07-19 RX ORDER — SODIUM CHLORIDE 9 MG/ML
1000 INJECTION, SOLUTION INTRAVENOUS
Refills: 0 | Status: DISCONTINUED | OUTPATIENT
Start: 2019-07-19 | End: 2019-07-19

## 2019-07-19 RX ADMIN — Medication 650 MILLIGRAM(S): at 10:28

## 2019-07-19 RX ADMIN — Medication 650 MILLIGRAM(S): at 09:58

## 2019-07-19 RX ADMIN — Medication 400 MILLIGRAM(S): at 13:46

## 2019-07-19 RX ADMIN — SODIUM CHLORIDE 50 MILLILITER(S): 9 INJECTION, SOLUTION INTRAVENOUS at 09:43

## 2019-07-19 RX ADMIN — Medication 400 MILLIGRAM(S): at 13:16

## 2019-07-19 NOTE — PROGRESS NOTE ADULT - ASSESSMENT
19 yr old male seen with   1.fever with vomiting and diarrhea after eating chinese food   possible viral gastroenteritis vs  organisms like B cereus possible as well  doing much better ,last fever spike last evening   cleared for d/c   fever ,vomiting and diarrhea resolved  tolerating food  2.Sepsis : sec to above resolved  3.Elevated lactate :sec to dehydration and sepsis : resolved  on iv fluids : now tolerating oral and lactate level back to normal

## 2019-07-19 NOTE — PROGRESS NOTE ADULT - REASON FOR ADMISSION
Fever, vomiting and diarrhea.

## 2019-07-19 NOTE — PROGRESS NOTE ADULT - SUBJECTIVE AND OBJECTIVE BOX
Patient is a 19y old  Male who presents with a chief complaint of Fever, vomiting and diarrhea x 1 day.  Denies sick contacts or recent travel.  No prior episodes.  Denies any drugs or alcohol use.    INTERVAL HPI/OVERNIGHT EVENTS:  Feels better, no further diarrhea.  Tolerating liq diet.    MEDICATIONS  (STANDING):  lactated ringers. 1000 milliLiter(s) (125 mL/Hr) IV Continuous <Continuous>    MEDICATIONS  (PRN):  acetaminophen   Tablet .. 650 milliGRAM(s) Oral every 6 hours PRN Temp greater or equal to 38C (100.4F), Mild Pain (1 - 3)  ibuprofen  Tablet. 400 milliGRAM(s) Oral every 6 hours PRN Temp greater or equal to 38C (100.4F)  morphine  - Injectable 2 milliGRAM(s) IV Push every 4 hours PRN Severe Pain (7 - 10)  morphine  - Injectable 2 milliGRAM(s) IV Push every 6 hours PRN Moderate Pain (4 - 6)  ondansetron Injectable 4 milliGRAM(s) IV Push every 6 hours PRN Nausea and/or Vomiting    Allergies:  No Known Allergies    REVIEW OF SYSTEMS:  All other systems reviewed and are negative    Vital Signs Last 24 Hrs  T(C): 36.6 (2019 04:48), Max: 39.1 (2019 15:49)  T(F): 97.8 (2019 04:48), Max: 102.4 (2019 15:49)  HR: 62 (2019 04:48) (60 - 98)  BP: 122/64 (2019 04:48) (98/41 - 122/64)  BP(mean): --  RR: 17 (2019 04:48) (16 - 19)  SpO2: 100% (2019 04:48) (98% - 100%)    PHYSICAL EXAM:  GENERAL: NAD, well-groomed, well-developed  HEAD:  Atraumatic, Normocephalic  EYES: EOMI, PERRLA, conjunctiva and sclera clear  ENMT: No tonsillar erythema, exudates, or enlargement; Moist mucous membranes, Good dentition, No lesions  NECK: Supple, No JVD, Normal thyroid  NERVOUS SYSTEM:  Alert & Oriented X3, Good concentration; Motor Strength 5/5 B/L upper and lower extremities; DTRs 2+ intact and symmetric  CHEST/LUNG: Clear to percussion bilaterally; No rales, rhonchi, wheezing, or rubs  HEART: Regular rate and rhythm; No murmurs, rubs, or gallops  ABDOMEN: Soft, mild tenderness, Nondistended; Bowel sounds present  EXTREMITIES:  2+ Peripheral Pulses, No clubbing, cyanosis, or edema  LYMPH: No lymphadenopathy noted  SKIN: No rashes or lesions    Labs                      13.4   6.20  )-----------( 157      ( 2019 07:14 )             43.1         140  |  109<H>  |  10  ----------------------------<  91  4.0   |  29  |  1.02    Ca    8.4<L>      2019 07:14  Phos  2.7       Mg     2.1         TPro  7.2  /  Alb  3.5  /  TBili  0.6  /  DBili  .27<H>  /  AST  25  /  ALT  40  /  AlkPhos  68  18    PT/INR - ( 2019 21:57 )   PT: 11.6 sec;   INR: 1.03 ratio    PTT - ( 2019 21:57 )  PTT:34.2 sec    Urinalysis Basic - ( 2019 01:43 )    Color: Yellow / Appearance: Clear / S.005 / pH: x  Gluc: x / Ketone: Negative  / Bili: Negative / Urobili: Negative mg/dL   Blood: x / Protein: Negative mg/dL / Nitrite: Negative   Leuk Esterase: Negative / RBC: x / WBC x   Sq Epi: x / Non Sq Epi: x / Bacteria: x    Culture - Urine (collected 2019 09:16)  Source: .Urine  Final Report (2019 03:38):    No growth    < from: CT Abdomen and Pelvis w/ IV Cont (19 @ 00:03) >  IMPRESSION:     No bowel obstruction or evidence of discrete inflammation.    Trace pelvic free fluid, nonspecific.    < from: US Abdomen Complete (19 @ 23:48) >  IMPRESSION:     No cholelithiasis or sonographic evidence of acute cholecystitis.        DVT prophylaxis: ambulatory, low risk

## 2019-07-19 NOTE — DISCHARGE NOTE PROVIDER - NSDCCPCAREPLAN_GEN_ALL_CORE_FT
PRINCIPAL DISCHARGE DIAGNOSIS  Diagnosis: Severe sepsis  Assessment and Plan of Treatment: likely due to acute viral gastroenteritis - resolved      SECONDARY DISCHARGE DIAGNOSES  Diagnosis: Vomiting  Assessment and Plan of Treatment:     Diagnosis: Sore throat  Assessment and Plan of Treatment: PRINCIPAL DISCHARGE DIAGNOSIS  Diagnosis: Severe sepsis  Assessment and Plan of Treatment: likely due to acute viral gastroenteritis - resolved

## 2019-07-19 NOTE — PROGRESS NOTE ADULT - SUBJECTIVE AND OBJECTIVE BOX
Patient is a 19y old  Male who presents with a chief complaint of Fever, vomiting and diarrhea. (2019 08:42)      HPI:  18 y/o Male  no significant PMH, PSH presents to the ED for fever, nausea, vomiting, abdominal pain and diarrhea for past day.  Patient reports 1 week of not feeling like himself, felt weak, today was worse than prior days.  Has been having nonbloody diarrhea, now with mild nausea and RUQ abdominal pain, which has since resolved, had small nbnb vomiting in ER.  Does endorse sore throat and mild cough.  No chest pain, dyspnea, neck stiffness, urinary symptoms.  No known travel or sick contacts. On no medications.  . (2019 03:55)      INTERVAL HPI/OVERNIGHT EVENTS:  The patient denies melena, hematochezia, hematemesis, nausea, vomiting, abdominal pain, constipation, diarrhea, or change in bowel movements Fair appetite. Ready to go home    MEDICATIONS  (STANDING):  lactated ringers. 1000 milliLiter(s) (50 mL/Hr) IV Continuous <Continuous>    MEDICATIONS  (PRN):  acetaminophen   Tablet .. 650 milliGRAM(s) Oral every 6 hours PRN Temp greater or equal to 38C (100.4F), Mild Pain (1 - 3)  ibuprofen  Tablet. 400 milliGRAM(s) Oral every 6 hours PRN Temp greater or equal to 38C (100.4F)  morphine  - Injectable 2 milliGRAM(s) IV Push every 4 hours PRN Severe Pain (7 - 10)  morphine  - Injectable 2 milliGRAM(s) IV Push every 6 hours PRN Moderate Pain (4 - 6)  ondansetron Injectable 4 milliGRAM(s) IV Push every 6 hours PRN Nausea and/or Vomiting      FAMILY HISTORY:  No pertinent family history in first degree relatives      Allergies    No Known Allergies    Intolerances        PMH/PSH:  No pertinent past medical history  No significant past surgical history        REVIEW OF SYSTEMS:  CONSTITUTIONAL: No fever, weight loss,   EYES: No eye pain, visual disturbances, or discharge  ENMT:  No difficulty hearing, tinnitus, vertigo; No sinus or throat pain  NECK: No pain or stiffness  BREASTS: No pain, masses, or nipple discharge  RESPIRATORY: No cough, wheezing, chills or hemoptysis; No shortness of breath  CARDIOVASCULAR: No chest pain, palpitations, dizziness, or leg swelling  GASTROINTESTINAL: See above  GENITOURINARY: No dysuria, frequency, hematuria, or incontinence  NEUROLOGICAL: No headaches, memory loss, loss of strength, numbness, or tremors  SKIN: No itching, burning, rashes, or lesions   LYMPH NODES: No enlarged glands  ENDOCRINE: No heat or cold intolerance; No hair loss  MUSCULOSKELETAL: No joint pain or swelling; No muscle, back, or extremity pain  PSYCHIATRIC: No depression, anxiety, mood swings, or difficulty sleeping  HEME/LYMPH: No easy bruising, or bleeding gums  ALLERGY AND IMMUNOLOGIC: No hives or eczema    Vital Signs Last 24 Hrs  T(C): 37.2 (2019 12:49), Max: 39.1 (2019 15:49)  T(F): 99 (2019 12:49), Max: 102.4 (2019 15:49)  HR: 79 (2019 12:49) (60 - 98)  BP: 115/50 (2019 12:49) (107/43 - 122/64)  BP(mean): --  RR: 16 (2019 12:49) (16 - 19)  SpO2: 100% (2019 12:49) (98% - 100%)    PHYSICAL EXAM:  GENERAL: NAD, well-groomed, well-developed  HEAD:  Atraumatic, Normocephalic  EYES: EOMI, PERRLA, conjunctiva and sclera clear  NECK: Supple, No JVD, Normal thyroid  NERVOUS SYSTEM:  Alert & Oriented X3, Good concentration; Motor Strength 5/5 B/L upper and lower extremities;  CHEST/LUNG: Clear to percussion bilaterally; No rales, rhonchi, wheezing, or rubs  HEART: Regular rate and rhythm; No murmurs, rubs, or gallops  ABDOMEN: Soft, Nontender, Nondistended; Bowel sounds present  EXTREMITIES:  2+ Peripheral Pulses, No clubbing, cyanosis, or edema  LYMPH: No lymphadenopathy noted  SKIN: No rashes or lesions    LAB   @ 05:22  amylase --   lipase 118                           13.4   6.20  )-----------( 157      ( 2019 07:14 )             43.1       CBC:   @ 07:14  WBC 6.20   Hgb 13.4   Hct 43.1   Plts 157  MCV 85.9   @ 05:22  WBC 12.04   Hgb 13.3   Hct 40.4   Plts 198  MCV 81.9   @ 21:57  WBC 8.38   Hgb 14.9   Hct 47.0   Plts 210  MCV 83.9      Chemistry:   @ 07:14  Na+ 140  K+ 4.0  Cl- 109  CO2 29  BUN 10  Cr 1.02      @ 05:22  Na+ 138  K+ 3.5  Cl- 107  CO2 25  BUN 14  Cr 1.26      @ 21:57  Na+ 138  K+ 3.4  Cl- 103  CO2 24  BUN 13  Cr 1.30         Glucose, Serum: 91 mg/dL ( @ 07:14)  Glucose, Serum: 122 mg/dL ( @ 05:22)  Glucose, Serum: 102 mg/dL ( @ 21:57)      2019 07:14    140    |  109    |  10     ----------------------------<  91     4.0     |  29     |  1.02   2019 05:22    138    |  107    |  14     ----------------------------<  122    3.5     |  25     |  1.26   2019 21:57    138    |  103    |  13     ----------------------------<  102    3.4     |  24     |  1.30     Ca    8.4        2019 07:14  Ca    8.7        2019 05:22  Ca    9.3        2019 21:57  Phos  2.7       2019 07:14  Phos  1.4       2019 05:22  Mg     2.1       2019 07:14  Mg     1.6       2019 05:22    TPro  7.2    /  Alb  3.5    /  TBili  0.6    /  DBili  .27    /  AST  25     /  ALT  40     /  AlkPhos  68     2019 05:22  TPro  8.0    /  Alb  4.1    /  TBili  0.6    /  DBili  x      /  AST  14     /  ALT  25     /  AlkPhos  77     2019 21:57      PT/INR - ( 2019 21:57 )   PT: 11.6 sec;   INR: 1.03 ratio         PTT - ( 2019 21:57 )  PTT:34.2 sec    Urinalysis Basic - ( 2019 01:43 )    Color: Yellow / Appearance: Clear / S.005 / pH: x  Gluc: x / Ketone: Negative  / Bili: Negative / Urobili: Negative mg/dL   Blood: x / Protein: Negative mg/dL / Nitrite: Negative   Leuk Esterase: Negative / RBC: x / WBC x   Sq Epi: x / Non Sq Epi: x / Bacteria: x        CAPILLARY BLOOD GLUCOSE              RADIOLOGY & ADDITIONAL TESTS:    Imaging Personally Reviewed:  [ ] YES  [ ] NO    Consultant(s) Notes Reviewed:  [ ] YES  [ ] NO    Care Discussed with Consultants/Other Providers [ ] YES  [ ] NO

## 2019-07-19 NOTE — PROGRESS NOTE ADULT - ASSESSMENT
Severe sepsis with lactic acidosis, possible gastroenteritis  GI and ID consults appreciated  Advance diet to soft  IV Zofran prn N/V  IV Morphine prn pain  If diarrhea recurs - check stool culture, O&P, C. diff    Hypokalemia, Hypophosphatemia, Hypomagnesemia  resolved

## 2019-07-19 NOTE — DISCHARGE NOTE PROVIDER - HOSPITAL COURSE
Patient is a 19y old  Male who presents with a chief complaint of Fever, vomiting and diarrhea x 1 day.    Denies sick contacts or recent travel.  No prior episodes.  Denies any drugs or alcohol use.    Treated with IV fluids and seen by GI and ID    Feels much better - no more diarrhea, or vomiting.  Tolerating diet        Dx: Severe sepsis with lactic acidosis, possibly acute viral gastroenteritis    Hypokalemia, Hypophosphatemia, Hypomagnesemia        Labs                        13.4     6.20  )-----------( 157      ( 2019 07:14 )               43.1         -19        140  |  109<H>  |  10    ----------------------------<  91    4.0   |  29  |  1.02        Ca    8.4<L>      2019 07:14    Phos  2.7         Mg     2.1             TPro  7.2  /  Alb  3.5  /  TBili  0.6  /  DBili  .27<H>  /  AST  25  /  ALT  40  /  AlkPhos  68  -18        PT/INR - ( 2019 21:57 )   PT: 11.6 sec;   INR: 1.03 ratio      PTT - ( 2019 21:57 )  PTT:34.2 sec        Urinalysis Basic - ( 2019 01:43 )        Color: Yellow / Appearance: Clear / S.005 / pH: x    Gluc: x / Ketone: Negative  / Bili: Negative / Urobili: Negative mg/dL     Blood: x / Protein: Negative mg/dL / Nitrite: Negative     Leuk Esterase: Negative / RBC: x / WBC x     Sq Epi: x / Non Sq Epi: x / Bacteria: x        Culture - Urine (collected 2019 09:16)    Source: .Urine    Final Report (2019 03:38):      No growth        Culture - Blood (collected 2019 09:11)    Source: .Blood    Preliminary Report (2019 10:01):      No growth to date.        Culture - Blood (collected 2019 09:11)    Source: .Blood    Preliminary Report (2019 10:01):      No growth to date.        < from: CT Abdomen and Pelvis w/ IV Cont (19 @ 00:03) >        IMPRESSION:         No bowel obstruction or evidence of discrete inflammation.        Trace pelvic free fluid, nonspecific.        Abd US:    IMPRESSION:         No cholelithiasis or sonographic evidence of acute cholecystitis.

## 2019-07-19 NOTE — PROGRESS NOTE ADULT - SUBJECTIVE AND OBJECTIVE BOX
Patient is a 19y old  Male who presents with a chief complaint of Fever, vomiting and diarrhea. (2019 14:08)      INTERVAL HPI / OVERNIGHT EVENTS: doing better, fever spike last evening but doing better, tolerating oral food, I BM : regular    MEDICATIONS  (STANDING):  lactated ringers. 1000 milliLiter(s) (50 mL/Hr) IV Continuous <Continuous>    MEDICATIONS  (PRN):  acetaminophen   Tablet .. 650 milliGRAM(s) Oral every 6 hours PRN Temp greater or equal to 38C (100.4F), Mild Pain (1 - 3)  ibuprofen  Tablet. 400 milliGRAM(s) Oral every 6 hours PRN Temp greater or equal to 38C (100.4F)  morphine  - Injectable 2 milliGRAM(s) IV Push every 4 hours PRN Severe Pain (7 - 10)  morphine  - Injectable 2 milliGRAM(s) IV Push every 6 hours PRN Moderate Pain (4 - 6)  ondansetron Injectable 4 milliGRAM(s) IV Push every 6 hours PRN Nausea and/or Vomiting      Vital Signs Last 24 Hrs  T(C): 37.2 (2019 12:49), Max: 39.1 (2019 15:49)  T(F): 99 (2019 12:49), Max: 102.4 (2019 15:49)  HR: 79 (2019 12:49) (60 - 98)  BP: 115/50 (2019 12:49) (107/43 - 122/64)  BP(mean): --  RR: 16 (2019 12:49) (16 - 19)  SpO2: 100% (2019 12:49) (98% - 100%)    Review of systems:  General : no fever /chills,fatigue  CVS : no chest pain, palpitations  Lungs : no shortness of breath, cough  GI : no abdominal pain,vomiting, diarrhea   : no dysuria,hematuria        PHYSICAL EXAM:  General :NAD  Constitutional:  well-groomed, well-developed  Respiratory: CTAB/L  Cardiovascular: S1 and S2, RRR, no M/G/R  Gastrointestinal: BS+, soft, NT/ND  Extremities: No peripheral edema  Vascular: 2+ peripheral pulses  Skin: No rashes      LABS:                        13.4   6.20  )-----------( 157      ( 2019 07:14 )             43.1         140  |  109<H>  |  10  ----------------------------<  91  4.0   |  29  |  1.02    Ca    8.4<L>      2019 07:14  Phos  2.7       Mg     2.1         TPro  7.2  /  Alb  3.5  /  TBili  0.6  /  DBili  .27<H>  /  AST  25  /  ALT  40  /  AlkPhos  68      Urinalysis Basic - ( 2019 01:43 )    Color: Yellow / Appearance: Clear / S.005 / pH: x  Gluc: x / Ketone: Negative  / Bili: Negative / Urobili: Negative mg/dL   Blood: x / Protein: Negative mg/dL / Nitrite: Negative   Leuk Esterase: Negative / RBC: x / WBC x   Sq Epi: x / Non Sq Epi: x / Bacteria: x          MICROBIOLOGY:  RECENT CULTURES:   .Urine XXXX XXXX   No growth     .Blood XXXX XXXX   No growth to date.          RADIOLOGY & ADDITIONAL STUDIES:

## 2019-07-19 NOTE — PROGRESS NOTE ADULT - ATTENDING COMMENTS
DC plan - possible discharge home later today or tomorrow AM if afebrile and no further diarrhea/vomiting and tolerating diet

## 2019-07-19 NOTE — DISCHARGE NOTE NURSING/CASE MANAGEMENT/SOCIAL WORK - NSDCDPATPORTLINK_GEN_ALL_CORE
You can access the [email or web address] Patient Portal, offered by [facility], by registering with the following website: [email or web address]

## 2019-07-19 NOTE — PROGRESS NOTE ADULT - PROBLEM SELECTOR PLAN 1
Continues to improve. WBC 6.20 Tmax 100.2. Tolerating solid diet. Stable from GI point of view for discharge

## 2019-07-22 LAB
CULTURE RESULTS: SIGNIFICANT CHANGE UP
SPECIMEN SOURCE: SIGNIFICANT CHANGE UP

## 2019-07-23 LAB
CULTURE RESULTS: SIGNIFICANT CHANGE UP
LACTOFERRIN STL-MCNC: <1 — SIGNIFICANT CHANGE UP (ref 0–7.24)
SPECIMEN SOURCE: SIGNIFICANT CHANGE UP
